# Patient Record
Sex: MALE | Race: OTHER | Employment: FULL TIME | URBAN - NONMETROPOLITAN AREA
[De-identification: names, ages, dates, MRNs, and addresses within clinical notes are randomized per-mention and may not be internally consistent; named-entity substitution may affect disease eponyms.]

---

## 2023-03-21 ENCOUNTER — ANESTHESIA (OUTPATIENT)
Age: 53
End: 2023-03-21
Payer: COMMERCIAL

## 2023-03-21 ENCOUNTER — ANESTHESIA EVENT (OUTPATIENT)
Age: 53
End: 2023-03-21
Payer: COMMERCIAL

## 2023-03-21 ENCOUNTER — HOSPITAL ENCOUNTER (OUTPATIENT)
Age: 53
Setting detail: OUTPATIENT SURGERY
Discharge: HOME | End: 2023-03-21
Attending: SURGERY | Admitting: SURGERY
Payer: COMMERCIAL

## 2023-03-21 VITALS
WEIGHT: 205 LBS | HEIGHT: 70 IN | HEART RATE: 78 BPM | TEMPERATURE: 96.8 F | OXYGEN SATURATION: 96 % | SYSTOLIC BLOOD PRESSURE: 118 MMHG | BODY MASS INDEX: 29.35 KG/M2 | DIASTOLIC BLOOD PRESSURE: 85 MMHG | RESPIRATION RATE: 16 BRPM

## 2023-03-21 PROCEDURE — 2580000003 HC RX 258: Performed by: NURSE ANESTHETIST, CERTIFIED REGISTERED

## 2023-03-21 PROCEDURE — 3700000000 HC ANESTHESIA ATTENDED CARE: Performed by: SURGERY

## 2023-03-21 PROCEDURE — 2709999900 HC NON-CHARGEABLE SUPPLY: Performed by: SURGERY

## 2023-03-21 PROCEDURE — 7100000011 HC PHASE II RECOVERY - ADDTL 15 MIN: Performed by: SURGERY

## 2023-03-21 PROCEDURE — 3700000001 HC ADD 15 MINUTES (ANESTHESIA): Performed by: SURGERY

## 2023-03-21 PROCEDURE — 7100000010 HC PHASE II RECOVERY - FIRST 15 MIN: Performed by: SURGERY

## 2023-03-21 PROCEDURE — 2500000003 HC RX 250 WO HCPCS: Performed by: NURSE ANESTHETIST, CERTIFIED REGISTERED

## 2023-03-21 RX ORDER — SODIUM CHLORIDE, SODIUM LACTATE, POTASSIUM CHLORIDE, CALCIUM CHLORIDE 600; 310; 30; 20 MG/100ML; MG/100ML; MG/100ML; MG/100ML
INJECTION, SOLUTION INTRAVENOUS CONTINUOUS
Status: DISCONTINUED | OUTPATIENT
Start: 2023-03-21 | End: 2023-03-21 | Stop reason: HOSPADM

## 2023-03-21 RX ORDER — SODIUM CHLORIDE 0.9 % (FLUSH) 0.9 %
5-40 SYRINGE (ML) INJECTION PRN
Status: DISCONTINUED | OUTPATIENT
Start: 2023-03-21 | End: 2023-03-21 | Stop reason: HOSPADM

## 2023-03-21 RX ORDER — SODIUM CHLORIDE 9 MG/ML
INJECTION, SOLUTION INTRAVENOUS PRN
Status: DISCONTINUED | OUTPATIENT
Start: 2023-03-21 | End: 2023-03-21 | Stop reason: HOSPADM

## 2023-03-21 RX ORDER — SODIUM CHLORIDE 0.9 % (FLUSH) 0.9 %
5-40 SYRINGE (ML) INJECTION EVERY 12 HOURS SCHEDULED
Status: DISCONTINUED | OUTPATIENT
Start: 2023-03-21 | End: 2023-03-21 | Stop reason: HOSPADM

## 2023-03-21 RX ADMIN — LIDOCAINE HYDROCHLORIDE 50 MG: 20 INJECTION, SOLUTION INFILTRATION; PERINEURAL at 08:23

## 2023-03-21 RX ADMIN — SODIUM CHLORIDE, POTASSIUM CHLORIDE, SODIUM LACTATE AND CALCIUM CHLORIDE: 600; 310; 30; 20 INJECTION, SOLUTION INTRAVENOUS at 06:51

## 2023-03-21 ASSESSMENT — PAIN - FUNCTIONAL ASSESSMENT: PAIN_FUNCTIONAL_ASSESSMENT: NONE - DENIES PAIN

## 2023-03-21 NOTE — ANESTHESIA POSTPROCEDURE EVALUATION
Department of Anesthesiology  Postprocedure Note    Patient: Sasha Goddard  MRN: [id number]  YOB: 1970  Date of evaluation: 3/21/2023      Procedure Summary     Date: 03/21/23 Room / [facility]    Anesthesia Start: 0823 Anesthesia Stop: 0847    Procedure: COLONOSCOPY (Rectum) Diagnosis:       Special screening for malignant neoplasms, colon      (Special screening for malignant neoplasms, colon [Z12.11])    Surgeons: Lorraine Dakin, MD Responsible Provider: AGUSTÍN Moran CRNA    Anesthesia Type: MAC ASA Status: 3          Anesthesia Type: MAC    Annette Phase I: Annette Score: 10    Annette Phase II: Annette Score: 9      Anesthesia Post Evaluation    Patient location during evaluation: bedside  Patient participation: complete - patient participated  Level of consciousness: awake and awake and alert  Pain score: 0  Airway patency: patent  Nausea & Vomiting: no nausea  Complications: no  Cardiovascular status: blood pressure returned to baseline  Respiratory status: acceptable  Hydration status: euvolemic  Multimodal analgesia pain management approach

## 2023-03-21 NOTE — OP NOTE
Operative Note      Patient: Duane Haymaker  YOB: 1970  MRN: [id number]    Date of Procedure: 3/21/2023    Pre-Op Diagnosis: Special screening for malignant neoplasms, colon [Z12.11]    Post-Op Diagnosis:  Normal colonoscopy       Procedure(s):  COLONOSCOPY    Surgeon(s):  José Miguel Dias MD    Assistant:   * No surgical staff found *    Anesthesia: Monitor Anesthesia Care    Estimated Blood Loss (mL): Minimal    Complications: None    Specimens:   * No specimens in log *    Implants:  * No implants in log *      Drains: * No LDAs found *    Findings: Procedurally colonoscopy was formed to level cecum upon slow withdrawal there is no pathology seen cecum ascending transverse descending sigmoid rectal regions prep was optimal.    Detailed Description of Procedure:   Patient was consented and nausea responsive the seizure was taken to the endoscopy placed left Q's position. Nasal cancer by 2 L of consultation managed by anesthesia colonoscope was passed without difficulty to the level cecum upon slow withdrawal there is no pathology seen cecum ascending transverse descending sigmoid or rectal regions. Patient tolerated procedure well without complication. Despite follow-up colonoscopy 10 years.     Electronically signed by Memo Orr MD on 3/21/2023 at 8:43 AM

## 2023-03-21 NOTE — ANESTHESIA PRE PROCEDURE
Department of Anesthesiology  Preprocedure Note       Name:  Amada Smith   Age:  47 y.o.  :  1970                                          MRN:  [id number]         Date:  3/21/2023      Surgeon: Samuel Hillman):  Princess Adrian MD    Procedure: Procedure(s):  COLONOSCOPY    Medications prior to admission:   Prior to Admission medications    Medication Sig Start Date End Date Taking? Authorizing Provider   OMEGA-3 FATTY ACIDS-VITAMIN E PO Take 1,000 mg by mouth 3 times daily    Ar Automatic Reconciliation   allopurinol (ZYLOPRIM) 300 MG tablet Take by mouth daily    Ar Automatic Reconciliation   aspirin 81 MG EC tablet Take 243 mg by mouth daily    Ar Automatic Reconciliation   indomethacin (INDOCIN) 50 MG capsule Take by mouth 3 times daily    Ar Automatic Reconciliation   metoprolol succinate (TOPROL XL) 50 MG extended release tablet TAKE 1 TABLET BY MOUTH EVERY DAY 20   Ar Automatic Reconciliation   naproxen (NAPROSYN) 500 MG tablet Take 500 mg by mouth 2 times daily (with meals)    Ar Automatic Reconciliation       Current medications:    Current Facility-Administered Medications   Medication Dose Route Frequency Provider Last Rate Last Admin    lactated ringers IV soln infusion   IntraVENous Continuous Wainwrightlilo Tucker, APRN - CRNA 50 mL/hr at 23 0651 New Bag at 23 0651    lactated ringers IV soln infusion   IntraVENous Continuous Hezzie Duane, APRN - CRNA        sodium chloride flush 0.9 % injection 5-40 mL  5-40 mL IntraVENous 2 times per day Hezzie Duane, APRN - CRNA        sodium chloride flush 0.9 % injection 5-40 mL  5-40 mL IntraVENous PRN Hezzie Duane, APRN - CRNA        0.9 % sodium chloride infusion   IntraVENous PRN Hezzie Duane, APRN - CRNA           Allergies: Allergies   Allergen Reactions    Latex Rash       Problem List:  There is no problem list on file for this patient.       Past Medical History:        Diagnosis Date    Arthritis     Asthma     CAD (coronary

## 2023-03-21 NOTE — PROGRESS NOTES
Pt given discharge instructions with verbalized understanding. No questions voiced. Pt getting dressed now for discharge.

## 2023-03-21 NOTE — DISCHARGE SUMMARY
Physician Discharge Summary     Patient ID:  Nilesh Chawla  [id number]  48 y.o.  1970    Admit date: 3/21/2023    Discharge date and time: No discharge date for patient encounter. Admitting Physician: Chelo York MD     Discharge Physician: Forrest Riojas    Admission Diagnoses: Special screening for malignant neoplasms, colon [Z12.11]    Discharge Diagnoses: Colonoscopy    Admission Condition: good    Discharged Condition: good    Indication for Admission: Patient was not admitted    Hospital Course: Underwent colonoscopy without difficulty which was normal    Consults: none    Significant Diagnostic Studies: None    Outstanding Order Results       No orders found from 2/20/2023 to 3/22/2023.             Treatments: Colonoscopy    Discharge Exam:  /78   Pulse 77   Temp 96.8 °F (36 °C) (Temporal)   Resp 16   Ht 5' 10\" (1.778 m)   Wt 205 lb (93 kg)   SpO2 97%   BMI 29.41 kg/m²     General Appearance:    Alert, cooperative, no distress, appears stated age   Head:    Normocephalic, without obvious abnormality, atraumatic   Eyes:    PERRL, conjunctiva/corneas clear, EOM's intact, fundi     benign, both eyes        Ears:    Normal TM's and external ear canals, both ears   Nose:   Nares normal, septum midline, mucosa normal, no drainage    or sinus tenderness   Throat:   Lips, mucosa, and tongue normal; teeth and gums normal   Neck:   Supple, symmetrical, trachea midline, no adenopathy;        thyroid:  No enlargement/tenderness/nodules; no carotid    bruit or JVD   Back:     Symmetric, no curvature, ROM normal, no CVA tenderness   Lungs:     Clear to auscultation bilaterally, respirations unlabored   Chest wall:    No tenderness or deformity   Heart:    Regular rate and rhythm, S1 and S2 normal, no murmur, rub   or gallop   Abdomen:     Soft, non-tender, bowel sounds active all four quadrants,     no masses, no organomegaly   Genitalia:    Normal male without lesion, discharge or tenderness   Rectal:

## (undated) DEVICE — TUBING, SUCTION, 9/32" X 10', STRAIGHT: Brand: MEDLINE

## (undated) DEVICE — SOLUTION IRRIG 1000ML STRL H2O USP PLAS POUR BTL

## (undated) DEVICE — Device: Brand: DEFENDO VALVE AND CONNECTOR KIT

## (undated) DEVICE — TUBING INSUFFLATION CAP W/ EXT CARBON DIOX ENDO SMARTCAP

## (undated) DEVICE — SOLUTION IRRIG 500ML STRL H2O NONPYROGENIC

## (undated) DEVICE — ENDOGATOR TUBING FOR BOSTON SCIENTIFIC ENDOSTAT II PUMP, OLYMPUS OFP PUMP OR ENDO STRATUS PUMP: Brand: ENDOGATOR

## (undated) DEVICE — KIT COLON W/ 1.1OZ LUB AND 2 END